# Patient Record
Sex: FEMALE | URBAN - METROPOLITAN AREA
[De-identification: names, ages, dates, MRNs, and addresses within clinical notes are randomized per-mention and may not be internally consistent; named-entity substitution may affect disease eponyms.]

---

## 2021-07-29 ENCOUNTER — OFFICE VISIT (OUTPATIENT)
Dept: URBAN - METROPOLITAN AREA CLINIC 44 | Facility: CLINIC | Age: 31
End: 2021-07-29
Payer: MEDICAID

## 2021-07-29 DIAGNOSIS — G43.909 MIGRAINE: Primary | ICD-10-CM

## 2021-07-29 DIAGNOSIS — H52.533 SPASM OF ACCOMMODATION, BILATERAL: ICD-10-CM

## 2021-07-29 PROCEDURE — 92004 COMPRE OPH EXAM NEW PT 1/>: CPT | Performed by: OPTOMETRIST

## 2021-07-29 PROCEDURE — 92133 CPTRZD OPH DX IMG PST SGM ON: CPT | Performed by: OPTOMETRIST

## 2021-07-29 ASSESSMENT — VISUAL ACUITY
OS: 20/20
OD: 20/20

## 2021-07-29 ASSESSMENT — INTRAOCULAR PRESSURE
OS: 18
OD: 19

## 2021-07-29 NOTE — IMPRESSION/PLAN
Impression: Migraine: G43.909. Ocular health normal OU. Vertical cupping OD .33, OS . 37 with normal RNFL OU. No Disc swelling OU. Plan: PLAN: Continue care with Neuro.  RTC 12 months for complete

## 2021-07-29 NOTE — IMPRESSION/PLAN
Impression: Spasm of accommodation, bilateral: H52.533. Mild OU. Plan: PLAN: Observe.  No glasses indicated at this time